# Patient Record
Sex: FEMALE | Employment: PART TIME | URBAN - METROPOLITAN AREA
[De-identification: names, ages, dates, MRNs, and addresses within clinical notes are randomized per-mention and may not be internally consistent; named-entity substitution may affect disease eponyms.]

---

## 2018-07-31 LAB
ALT SERPL-CCNC: 9 U/L (ref 8–45)
AST SERPL-CCNC: 16 U/L (ref 5–41)
CREAT SERPL-MCNC: 0.77 MG/DL (ref 0.57–1.11)
GFR SERPL CREATININE-BSD FRML MDRD: >60 ML/MIN/1.73M2
GLUCOSE SERPL-MCNC: 115 MG/DL (ref 70–100)
POTASSIUM SERPL-SCNC: 3.4 MMOL/L (ref 3.5–5.1)
TSH SERPL-ACNC: 0.73 UIU/ML (ref 0.47–5)

## 2018-08-08 ENCOUNTER — TRANSFERRED RECORDS (OUTPATIENT)
Dept: HEALTH INFORMATION MANAGEMENT | Facility: CLINIC | Age: 28
End: 2018-08-08

## 2018-08-09 ENCOUNTER — TRANSFERRED RECORDS (OUTPATIENT)
Dept: HEALTH INFORMATION MANAGEMENT | Facility: CLINIC | Age: 28
End: 2018-08-09

## 2018-08-09 LAB
CREAT SERPL-MCNC: 0.78 MG/DL (ref 0.57–1.11)
GFR SERPL CREATININE-BSD FRML MDRD: >60 ML/MIN/1.73M2
GLUCOSE SERPL-MCNC: 89 MG/DL (ref 70–100)
POTASSIUM SERPL-SCNC: 3.8 MMOL/L (ref 3.5–5.1)

## 2018-08-27 ENCOUNTER — TRANSFERRED RECORDS (OUTPATIENT)
Dept: HEALTH INFORMATION MANAGEMENT | Facility: CLINIC | Age: 28
End: 2018-08-27

## 2018-10-22 RX ORDER — DICYCLOMINE HCL 20 MG
20 TABLET ORAL 2 TIMES DAILY PRN
COMMUNITY

## 2018-10-24 ENCOUNTER — NURSE TRIAGE (OUTPATIENT)
Dept: NURSING | Facility: CLINIC | Age: 28
End: 2018-10-24

## 2018-10-24 NOTE — TELEPHONE ENCOUNTER
Calling to clarify her colonoscopy prep instructions.  Questions answered.    Dhara Craven RN  [facility]

## 2018-10-29 ENCOUNTER — HOSPITAL ENCOUNTER (OUTPATIENT)
Facility: AMBULATORY SURGERY CENTER | Age: 28
Discharge: HOME | End: 2018-10-29
Attending: INTERNAL MEDICINE | Admitting: INTERNAL MEDICINE
Payer: COMMERCIAL

## 2018-10-29 ENCOUNTER — SURGERY (OUTPATIENT)
Age: 28
End: 2018-10-29
Payer: COMMERCIAL

## 2018-10-29 VITALS
TEMPERATURE: 100.1 F | WEIGHT: 106 LBS | SYSTOLIC BLOOD PRESSURE: 117 MMHG | RESPIRATION RATE: 16 BRPM | DIASTOLIC BLOOD PRESSURE: 79 MMHG | BODY MASS INDEX: 18.78 KG/M2 | HEIGHT: 63 IN | OXYGEN SATURATION: 99 %

## 2018-10-29 LAB — COLONOSCOPY: NORMAL

## 2018-10-29 PROCEDURE — 45380 COLONOSCOPY AND BIOPSY: CPT

## 2018-10-29 PROCEDURE — G8907 PT DOC NO EVENTS ON DISCHARG: HCPCS

## 2018-10-29 PROCEDURE — 45380 COLONOSCOPY AND BIOPSY: CPT | Performed by: INTERNAL MEDICINE

## 2018-10-29 PROCEDURE — G8918 PT W/O PREOP ORDER IV AB PRO: HCPCS

## 2018-10-29 PROCEDURE — 88305 TISSUE EXAM BY PATHOLOGIST: CPT | Performed by: INTERNAL MEDICINE

## 2018-10-29 RX ORDER — LIDOCAINE 40 MG/G
CREAM TOPICAL
Status: DISCONTINUED | OUTPATIENT
Start: 2018-10-29 | End: 2018-10-30 | Stop reason: HOSPADM

## 2018-10-29 RX ORDER — ONDANSETRON 2 MG/ML
4 INJECTION INTRAMUSCULAR; INTRAVENOUS
Status: DISCONTINUED | OUTPATIENT
Start: 2018-10-29 | End: 2018-10-30 | Stop reason: HOSPADM

## 2018-10-29 RX ORDER — FENTANYL CITRATE 50 UG/ML
INJECTION, SOLUTION INTRAMUSCULAR; INTRAVENOUS PRN
Status: DISCONTINUED | OUTPATIENT
Start: 2018-10-29 | End: 2018-10-29 | Stop reason: HOSPADM

## 2018-10-29 RX ADMIN — FENTANYL CITRATE 50 MCG: 50 INJECTION, SOLUTION INTRAMUSCULAR; INTRAVENOUS at 08:35

## 2018-10-29 RX ADMIN — FENTANYL CITRATE 50 MCG: 50 INJECTION, SOLUTION INTRAMUSCULAR; INTRAVENOUS at 08:32

## 2018-10-29 NOTE — LETTER
"        Ms.Bethany BEBE Courtney  [address] [address] [address]        November 2, 2018    Dear ,    We are writing to inform you of your test results. The biopsies of the small and large intestine are normal and no signs of IBD. Results were sent to your PCP.     Pascual Kent MD   [facility] Gastroenterology [facility]atology   [facility] Adjunct     Resulted Orders   Surgical pathology exam   Result Value Ref Range    Copath Report       Patient Name: COMPA COURTNEY  MR#: [id number]  Specimen #: [id number]  Collected: 10/29/2018  Received: 10/30/2018  Reported: 10/31/2018 17:36  Ordering Phy(s): PASCUAL KENT    For improved result formatting, select 'View Enhanced Report Format' under   Linked Documents section.    SPECIMEN(S):  A: Terminal ileum biopsy  B: Colon biopsy, left  C: Rectal biopsy    FINAL DIAGNOSIS:  A. ILEUM, BIOPSY:  - Small intestinal mucosa with no significant histologic abnormality  - Negative for active inflammation, granuloma formation and dysplasia    B. COLON, LEFT, BIOPSY:  - Colonic mucosa with no significant histologic abnormality  - Negative for crypt glandular distortion, acute inflammation and   dysplasia    C. COLON, RECTUM, BIOPSY:  - Colorectal mucosa with no significant histologic abnormality  - Negative for crypt glandular distortion, acute inflammation and   dysplasia    I have personally reviewed all specimens and/or slides, including the   listed special stains, and  used them  with my medical judgement to determine or confirm the final diagnosis.    Electronically signed out by:    Randy Camara M.D., [facility]    CLINICAL HISTORY:  History of generalized abdominal pain and change in bowel habits    GROSS:  A: The specimen is received in formalin with proper patient   identification, labeled \"terminal ileum BX\".  The  specimen consists of two segments of tan soft " "tissue ranging from 0.3-0.4   cm in greatest dimension.  It is  entirely submitted in cassette A1.    B: The specimen is received in formalin with proper patient   identification, labeled \"left colon BX\".  The  specimen consists of four segments of tan soft tissue ranging from 0.2-0.5   cm in greatest dimension.  It is  entirely submitted in cassette B1.    C: The specimen is received in formalin with proper patient   identification, labeled \"rectum biopsy\".  The  specimen consists of three segments of tan soft tissue ranging from   0.1-0.3 cm in greatest dimension.  It is  entirel y submitted in cassette C1. (Dictated by: Lanie GUILLORY [facility]   10/30/2018 09:38 AM)    MICROSCOPIC:  Microscopic examination was performed.    CPT Codes:  A: 64698-JM6  B: 93618-TV8  C: 44195-BR8    TESTING LAB LOCATION:  [facility], [address]   [id number] [phone]    COLLECTION SITE:  Client: [facility]  Location: [facility] (B)    Resident  [id number]                   "

## 2018-10-31 LAB — COPATH REPORT: NORMAL

## (undated) DEVICE — SOL WATER IRRIG 1000ML BOTTLE 07139-09

## (undated) RX ORDER — FENTANYL CITRATE 50 UG/ML
INJECTION, SOLUTION INTRAMUSCULAR; INTRAVENOUS
Status: DISPENSED
Start: 2018-10-29

## (undated) RX ORDER — SIMETHICONE 40MG/0.6ML
SUSPENSION, DROPS(FINAL DOSAGE FORM)(ML) ORAL
Status: DISPENSED
Start: 2018-10-29